# Patient Record
Sex: FEMALE | Race: WHITE
[De-identification: names, ages, dates, MRNs, and addresses within clinical notes are randomized per-mention and may not be internally consistent; named-entity substitution may affect disease eponyms.]

---

## 2017-04-17 ENCOUNTER — HOSPITAL ENCOUNTER (OUTPATIENT)
Dept: HOSPITAL 53 - M LABDRAW1 | Age: 72
End: 2017-04-17
Attending: NURSE PRACTITIONER
Payer: MEDICARE

## 2017-04-17 DIAGNOSIS — E55.9: ICD-10-CM

## 2017-04-17 DIAGNOSIS — I10: ICD-10-CM

## 2017-04-17 DIAGNOSIS — E78.5: Primary | ICD-10-CM

## 2017-04-17 LAB
ALBUMIN SERPL BCG-MCNC: 3.8 GM/DL (ref 3.2–5.2)
ALBUMIN/GLOB SERPL: 1.19 {RATIO} (ref 1–1.93)
ALP SERPL-CCNC: 64 U/L (ref 45–117)
ALT SERPL W P-5'-P-CCNC: 22 U/L (ref 12–78)
ANION GAP SERPL CALC-SCNC: 5 MEQ/L (ref 8–16)
AST SERPL-CCNC: 14 U/L (ref 15–37)
BILIRUB SERPL-MCNC: 0.8 MG/DL (ref 0.2–1)
BUN SERPL-MCNC: 15 MG/DL (ref 7–18)
CALCIUM SERPL-MCNC: 9.1 MG/DL (ref 8.8–10.2)
CHLORIDE SERPL-SCNC: 104 MEQ/L (ref 98–107)
CHOLEST SERPL-MCNC: 168 MG/DL (ref ?–200)
CO2 SERPL-SCNC: 33 MEQ/L (ref 21–32)
CREAT SERPL-MCNC: 0.77 MG/DL (ref 0.55–1.02)
GFR SERPL CREATININE-BSD FRML MDRD: > 60 ML/MIN/{1.73_M2} (ref 45–?)
GLUCOSE SERPL-MCNC: 108 MG/DL (ref 80–110)
POTASSIUM SERPL-SCNC: 4.6 MEQ/L (ref 3.5–5.1)
PROT SERPL-MCNC: 7 GM/DL (ref 6.4–8.2)
SODIUM SERPL-SCNC: 142 MEQ/L (ref 136–145)
TRIGL SERPL-MCNC: 94 MG/DL (ref ?–150)

## 2017-08-30 ENCOUNTER — HOSPITAL ENCOUNTER (OUTPATIENT)
Dept: HOSPITAL 53 - M LAB | Age: 72
End: 2017-08-30
Attending: OPHTHALMOLOGY
Payer: MEDICARE

## 2017-08-30 DIAGNOSIS — H25.12: Primary | ICD-10-CM

## 2017-08-30 DIAGNOSIS — I10: ICD-10-CM

## 2017-08-30 LAB
ANION GAP SERPL CALC-SCNC: 8 MEQ/L (ref 8–16)
BUN SERPL-MCNC: 16 MG/DL (ref 7–18)
CALCIUM SERPL-MCNC: 9.9 MG/DL (ref 8.8–10.2)
CHLORIDE SERPL-SCNC: 103 MEQ/L (ref 98–107)
CO2 SERPL-SCNC: 32 MEQ/L (ref 21–32)
CREAT SERPL-MCNC: 0.83 MG/DL (ref 0.55–1.02)
GFR SERPL CREATININE-BSD FRML MDRD: > 60 ML/MIN/{1.73_M2} (ref 39–?)
GLUCOSE SERPL-MCNC: 114 MG/DL (ref 83–110)
POTASSIUM SERPL-SCNC: 4.5 MEQ/L (ref 3.5–5.1)
SODIUM SERPL-SCNC: 143 MEQ/L (ref 136–145)

## 2017-08-30 NOTE — ECGEPIP
Stationary ECG Study

                              [facility]

                                       

                                       Test Date:    2017

Pat Name:     YELENA EVANS         Department:   

Patient ID:   [id number]                 Room:         -

Gender:       F                        Technician:   [facility]

:          1945               Requested By: ORLIN BENDER

Order Number: [id number]     Reading MD:   Jusitn Rehman

                                 Measurements

Intervals                              Axis          

Rate:         71                       P:            53

VA:           171                      QRS:          52

QRSD:         80                       T:            17

QT:           363                                    

QTc:          394                                    

                           Interpretive Statements

SINUS RHYTHM

LOW QRS VOLTAGE IN PRECORDIAL LEADS

Poor R-wave progression.

No prior ECG available for comparison at the time of interpretation.

 

Electronically Signed On 2017 23:23:13 EDT by Justin Rehman

## 2018-06-15 ENCOUNTER — HOSPITAL ENCOUNTER (OUTPATIENT)
Dept: HOSPITAL 53 - M LABDRAW1 | Age: 73
End: 2018-06-15
Attending: NURSE PRACTITIONER
Payer: MEDICARE

## 2018-06-15 DIAGNOSIS — I10: ICD-10-CM

## 2018-06-15 DIAGNOSIS — E78.5: Primary | ICD-10-CM

## 2018-06-15 DIAGNOSIS — E55.9: ICD-10-CM

## 2018-06-15 DIAGNOSIS — Z79.899: ICD-10-CM

## 2018-06-15 LAB
25(OH)D3 SERPL-MCNC: 43.6 NG/ML (ref 30–100)
ALBUMIN/GLOBULIN RATIO: 1.19 (ref 1–1.93)
ALBUMIN: 3.8 GM/DL (ref 3.2–5.2)
ALKALINE PHOSPHATASE: 60 U/L (ref 45–117)
ALT SERPL W P-5'-P-CCNC: 26 U/L (ref 12–78)
ANION GAP: 3 MEQ/L (ref 8–16)
AST SERPL-CCNC: 17 U/L (ref 7–37)
BASO #: 0.1 10^3/UL (ref 0–0.2)
BASO %: 1.9 % (ref 0–1)
BILIRUBIN,TOTAL: 0.8 MG/DL (ref 0.2–1)
BLOOD UREA NITROGEN: 19 MG/DL (ref 7–18)
CALCIUM LEVEL: 9 MG/DL (ref 8.8–10.2)
CARBON DIOXIDE LEVEL: 32 MEQ/L (ref 21–32)
CHLORIDE LEVEL: 106 MEQ/L (ref 98–107)
CHOLEST SERPL-MCNC: 167 MG/DL (ref ?–200)
CHOLESTEROL RISK RATIO: 2.57 (ref ?–5)
CREATININE FOR GFR: 0.75 MG/DL (ref 0.55–1.3)
EOS #: 0.2 10^3/UL (ref 0–0.5)
EOSINOPHIL NFR BLD AUTO: 5 % (ref 0–3)
GFR SERPL CREATININE-BSD FRML MDRD: > 60 ML/MIN/{1.73_M2} (ref 39–?)
GLUCOSE, FASTING: 105 MG/DL (ref 70–100)
HDLC SERPL-MCNC: 65 MG/DL (ref 40–?)
HEMATOCRIT: 42.2 % (ref 36–47)
HEMOGLOBIN: 13.8 G/DL (ref 12–15.5)
IMMATURE GRANULOCYTE %: 0.2 % (ref 0–3)
LDL CHOLESTEROL: 86 MG/DL (ref ?–100)
LYMPH #: 1.5 10^3/UL (ref 1.5–4.5)
LYMPH %: 32.7 % (ref 24–44)
MEAN CORPUSCULAR HEMOGLOBIN: 29.8 PG (ref 27–33)
MEAN CORPUSCULAR HGB CONC: 32.7 G/DL (ref 32–36.5)
MEAN CORPUSCULAR VOLUME: 91.1 FL (ref 80–96)
MONO #: 0.3 10^3/UL (ref 0–0.8)
MONO %: 6.5 % (ref 0–5)
NEUTROPHILS #: 2.5 10^3/UL (ref 1.8–7.7)
NEUTROPHILS %: 53.7 % (ref 36–66)
NONHDLC SERPL-MCNC: 102 MG/DL
NRBC BLD AUTO-RTO: 0 % (ref 0–0)
PLATELET COUNT, AUTOMATED: 293 10^3/UL (ref 150–450)
POTASSIUM SERUM: 4.2 MEQ/L (ref 3.5–5.1)
RED BLOOD COUNT: 4.63 10^6/UL (ref 4–5.4)
RED CELL DISTRIBUTION WIDTH: 13.7 % (ref 11.5–14.5)
SODIUM LEVEL: 141 MEQ/L (ref 136–145)
TOTAL PROTEIN: 7 GM/DL (ref 6.4–8.2)
TRIGLYCERIDES LEVEL: 80 MG/DL (ref ?–150)
WHITE BLOOD COUNT: 4.6 10^3/UL (ref 4–10)

## 2018-06-15 PROCEDURE — 80053 COMPREHEN METABOLIC PANEL: CPT

## 2018-07-31 ENCOUNTER — HOSPITAL ENCOUNTER (OUTPATIENT)
Dept: HOSPITAL 53 - M LABDRAW1 | Age: 73
End: 2018-07-31
Attending: NURSE PRACTITIONER
Payer: MEDICARE

## 2018-07-31 DIAGNOSIS — R93.8: Primary | ICD-10-CM

## 2018-07-31 LAB
FREE T3: 2.9 PG/ML (ref 2.2–4)
FREE T4: 0.96 NG/DL (ref 0.76–1.46)
THYROID STIMULATING HORMONE: 2.9 UIU/ML (ref 0.36–3.74)

## 2018-07-31 PROCEDURE — 84443 ASSAY THYROID STIM HORMONE: CPT

## 2020-04-15 ENCOUNTER — HOSPITAL ENCOUNTER (OUTPATIENT)
Dept: HOSPITAL 53 - M LABDRAW1 | Age: 75
End: 2020-04-15
Attending: FAMILY MEDICINE
Payer: MEDICARE

## 2020-04-15 DIAGNOSIS — I10: ICD-10-CM

## 2020-04-15 DIAGNOSIS — E55.9: Primary | ICD-10-CM

## 2020-04-15 DIAGNOSIS — Z79.899: ICD-10-CM

## 2020-04-15 LAB
25(OH)D3 SERPL-MCNC: 85.8 NG/ML (ref 30–100)
ALBUMIN SERPL BCG-MCNC: 4.2 GM/DL (ref 3.2–5.2)
ALT SERPL W P-5'-P-CCNC: 29 U/L (ref 12–78)
BILIRUB SERPL-MCNC: 1.1 MG/DL (ref 0.2–1)
BUN SERPL-MCNC: 19 MG/DL (ref 7–18)
CALCIUM SERPL-MCNC: 9.9 MG/DL (ref 8.8–10.2)
CHLORIDE SERPL-SCNC: 103 MEQ/L (ref 98–107)
CHOLEST SERPL-MCNC: 183 MG/DL (ref ?–200)
CHOLEST/HDLC SERPL: 2.73 {RATIO} (ref ?–5)
CO2 SERPL-SCNC: 31 MEQ/L (ref 21–32)
CREAT SERPL-MCNC: 0.75 MG/DL (ref 0.55–1.3)
GFR SERPL CREATININE-BSD FRML MDRD: > 60 ML/MIN/{1.73_M2} (ref 39–?)
GLUCOSE SERPL-MCNC: 103 MG/DL (ref 70–100)
HCT VFR BLD AUTO: 45.2 % (ref 36–47)
HDLC SERPL-MCNC: 67 MG/DL (ref 40–?)
HGB BLD-MCNC: 14.6 G/DL (ref 12–15.5)
LDLC SERPL CALC-MCNC: 94 MG/DL (ref ?–100)
MCH RBC QN AUTO: 29.6 PG (ref 27–33)
MCHC RBC AUTO-ENTMCNC: 32.3 G/DL (ref 32–36.5)
MCV RBC AUTO: 91.7 FL (ref 80–96)
NONHDLC SERPL-MCNC: 116 MG/DL
PLATELET # BLD AUTO: 325 10^3/UL (ref 150–450)
POTASSIUM SERPL-SCNC: 4.2 MEQ/L (ref 3.5–5.1)
PROT SERPL-MCNC: 7.4 GM/DL (ref 6.4–8.2)
RBC # BLD AUTO: 4.93 10^6/UL (ref 4–5.4)
SODIUM SERPL-SCNC: 140 MEQ/L (ref 136–145)
TRIGL SERPL-MCNC: 108 MG/DL (ref ?–150)
WBC # BLD AUTO: 5.5 10^3/UL (ref 4–10)

## 2021-04-20 ENCOUNTER — HOSPITAL ENCOUNTER (OUTPATIENT)
Dept: HOSPITAL 53 - M LAB | Age: 76
End: 2021-04-20
Attending: FAMILY MEDICINE
Payer: MEDICARE

## 2021-04-20 DIAGNOSIS — E55.9: Primary | ICD-10-CM

## 2021-04-20 DIAGNOSIS — E78.00: ICD-10-CM

## 2021-04-20 LAB
25(OH)D3 SERPL-MCNC: 77.5 NG/ML (ref 30–100)
ALBUMIN SERPL BCG-MCNC: 4.1 GM/DL (ref 3.2–5.2)
ALT SERPL W P-5'-P-CCNC: 28 U/L (ref 12–78)
BILIRUB SERPL-MCNC: 1 MG/DL (ref 0.2–1)
BUN SERPL-MCNC: 19 MG/DL (ref 7–18)
CALCIUM SERPL-MCNC: 9.6 MG/DL (ref 8.8–10.2)
CHLORIDE SERPL-SCNC: 103 MEQ/L (ref 98–107)
CHOLEST SERPL-MCNC: 191 MG/DL (ref ?–200)
CHOLEST/HDLC SERPL: 2.73 {RATIO} (ref ?–5)
CO2 SERPL-SCNC: 30 MEQ/L (ref 21–32)
CREAT SERPL-MCNC: 0.75 MG/DL (ref 0.55–1.3)
GFR SERPL CREATININE-BSD FRML MDRD: > 60 ML/MIN/{1.73_M2} (ref 39–?)
GLUCOSE SERPL-MCNC: 113 MG/DL (ref 70–100)
HCT VFR BLD AUTO: 44.5 % (ref 36–47)
HDLC SERPL-MCNC: 70 MG/DL (ref 40–?)
HGB BLD-MCNC: 14.2 G/DL (ref 12–15.5)
LDLC SERPL CALC-MCNC: 101 MG/DL (ref ?–100)
MCH RBC QN AUTO: 29.1 PG (ref 27–33)
MCHC RBC AUTO-ENTMCNC: 31.9 G/DL (ref 32–36.5)
MCV RBC AUTO: 91.2 FL (ref 80–96)
NONHDLC SERPL-MCNC: 121 MG/DL
PLATELET # BLD AUTO: 313 10^3/UL (ref 150–450)
POTASSIUM SERPL-SCNC: 4.3 MEQ/L (ref 3.5–5.1)
PROT SERPL-MCNC: 7.3 GM/DL (ref 6.4–8.2)
RBC # BLD AUTO: 4.88 10^6/UL (ref 4–5.4)
SODIUM SERPL-SCNC: 138 MEQ/L (ref 136–145)
TRIGL SERPL-MCNC: 98 MG/DL (ref ?–150)
WBC # BLD AUTO: 5.7 10^3/UL (ref 4–10)

## 2021-04-28 ENCOUNTER — HOSPITAL ENCOUNTER (OUTPATIENT)
Dept: HOSPITAL 53 - M WUC | Age: 76
End: 2021-04-28
Attending: FAMILY MEDICINE
Payer: MEDICARE

## 2021-04-28 DIAGNOSIS — M54.12: Primary | ICD-10-CM

## 2021-04-28 NOTE — REP
INDICATION:

RADICULOPATHY.



COMPARISON:

Pre operative exam of 06/15/2018



TECHNIQUE:

Seven views



FINDINGS:

Since last examination anterior cervical discectomy has been performed with an

internal fixation plate and fixing screws seen C5 through C7 inclusive.  None of the

affixing screws breach the endplates or posterior vertebral body cortices.  Bone graft

material is seen at the C5-6 and C6-7 levels.  Mild disc space narrowing seen at all

other levels.  There is some limitation of flexion and extension radiographically.

The intervertebral foramina appear mildly narrowed at C4-5 and C5-6.  The dens cannot

be effectively evaluated secondary to the superimposition of osseous structures and/or

dentition on all views.  Degenerative facet and uncovertebral joint changes are

present at every level bilaterally.





IMPRESSION:

Postsurgical changes as described above.  Degenerative changes as described above.

Although this plain radiographic evaluation of the cervical spine shows no evidence of

a fracture, it should be remembered that CT is much more sensitive than plain

radiography of the C-spine in detecting fractures.  If this examination was ordered to

rule out a fracture then CT of the cervical spine is recommended.





<Electronically signed by Kristian Cardenas > 04/28/21 [id number]

## 2021-07-20 ENCOUNTER — HOSPITAL ENCOUNTER (OUTPATIENT)
Dept: HOSPITAL 53 - M WUC | Age: 76
End: 2021-07-20
Attending: NURSE PRACTITIONER
Payer: MEDICARE

## 2021-07-20 DIAGNOSIS — N39.0: Primary | ICD-10-CM

## 2021-07-23 ENCOUNTER — HOSPITAL ENCOUNTER (EMERGENCY)
Dept: HOSPITAL 53 - M ED | Age: 76
LOS: 1 days | Discharge: HOME | End: 2021-07-24
Payer: MEDICARE

## 2021-07-23 VITALS — HEIGHT: 61 IN | BODY MASS INDEX: 25.81 KG/M2 | WEIGHT: 136.69 LBS

## 2021-07-23 DIAGNOSIS — E78.5: ICD-10-CM

## 2021-07-23 DIAGNOSIS — I10: ICD-10-CM

## 2021-07-23 DIAGNOSIS — Z79.899: ICD-10-CM

## 2021-07-23 DIAGNOSIS — E27.9: Primary | ICD-10-CM

## 2021-07-23 DIAGNOSIS — R10.9: ICD-10-CM

## 2021-07-23 DIAGNOSIS — Z79.82: ICD-10-CM

## 2021-07-23 PROCEDURE — 80047 BASIC METABLC PNL IONIZED CA: CPT

## 2021-07-23 PROCEDURE — 85025 COMPLETE CBC W/AUTO DIFF WBC: CPT

## 2021-07-23 PROCEDURE — 80076 HEPATIC FUNCTION PANEL: CPT

## 2021-07-23 PROCEDURE — 83690 ASSAY OF LIPASE: CPT

## 2021-07-23 PROCEDURE — 74177 CT ABD & PELVIS W/CONTRAST: CPT

## 2021-07-23 PROCEDURE — 96374 THER/PROPH/DIAG INJ IV PUSH: CPT

## 2021-07-23 PROCEDURE — 81001 URINALYSIS AUTO W/SCOPE: CPT

## 2021-07-23 PROCEDURE — 96361 HYDRATE IV INFUSION ADD-ON: CPT

## 2021-07-23 PROCEDURE — 99284 EMERGENCY DEPT VISIT MOD MDM: CPT

## 2021-07-23 RX ADMIN — DIATRIZOATE MEGLUMINE AND DIATRIZOATE SODIUM SCH ML: 600; 100 SOLUTION ORAL; RECTAL at 23:45

## 2021-07-24 ENCOUNTER — HOSPITAL ENCOUNTER (EMERGENCY)
Dept: HOSPITAL 53 - M ED | Age: 76
Discharge: HOME | End: 2021-07-24
Payer: MEDICARE

## 2021-07-24 VITALS — BODY MASS INDEX: 55.32 KG/M2 | WEIGHT: 293 LBS | HEIGHT: 61 IN

## 2021-07-24 VITALS — SYSTOLIC BLOOD PRESSURE: 122 MMHG | DIASTOLIC BLOOD PRESSURE: 60 MMHG

## 2021-07-24 VITALS — DIASTOLIC BLOOD PRESSURE: 79 MMHG | SYSTOLIC BLOOD PRESSURE: 156 MMHG

## 2021-07-24 DIAGNOSIS — T36.95XA: ICD-10-CM
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
## 2021-07-24 DIAGNOSIS — Z79.899: ICD-10-CM

## 2021-07-24 DIAGNOSIS — I10: ICD-10-CM

## 2021-07-24 DIAGNOSIS — Z79.82: ICD-10-CM

## 2021-07-24 DIAGNOSIS — R21: Primary | ICD-10-CM
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
## 2021-07-24 LAB
ALBUMIN SERPL BCG-MCNC: 3.3 GM/DL (ref 3.2–5.2)
ALT SERPL W P-5'-P-CCNC: 55 U/L (ref 12–78)
BASOPHILS # BLD AUTO: 0 10^3/UL (ref 0–0.2)
BASOPHILS NFR BLD AUTO: 0.2 % (ref 0–1)
BILIRUB CONJ SERPL-MCNC: 0.2 MG/DL (ref 0–0.2)
BILIRUB SERPL-MCNC: 0.6 MG/DL (ref 0.2–1)
EOSINOPHIL # BLD AUTO: 0.4 10^3/UL (ref 0–0.5)
EOSINOPHIL NFR BLD AUTO: 4.9 % (ref 0–3)
HCT VFR BLD AUTO: 41.5 % (ref 36–47)
HGB BLD-MCNC: 14 G/DL (ref 12–15.5)
LIPASE SERPL-CCNC: 73 U/L (ref 73–393)
LYMPHOCYTES # BLD AUTO: 0.3 10^3/UL (ref 1.5–5)
LYMPHOCYTES NFR BLD AUTO: 3.5 % (ref 24–44)
MCH RBC QN AUTO: 29.6 PG (ref 27–33)
MCHC RBC AUTO-ENTMCNC: 33.7 G/DL (ref 32–36.5)
MCV RBC AUTO: 87.7 FL (ref 80–96)
MONOCYTES # BLD AUTO: 0.3 10^3/UL (ref 0–0.8)
MONOCYTES NFR BLD AUTO: 4 % (ref 2–8)
NEUTROPHILS # BLD AUTO: 7.1 10^3/UL (ref 1.5–8.5)
NEUTROPHILS NFR BLD AUTO: 86.7 % (ref 36–66)
PLATELET # BLD AUTO: 302 10^3/UL (ref 150–450)
PROT SERPL-MCNC: 6.8 GM/DL (ref 6.4–8.2)
RBC # BLD AUTO: 4.73 10^6/UL (ref 4–5.4)
WBC # BLD AUTO: 8.2 10^3/UL (ref 4–10)

## 2021-07-24 PROCEDURE — 99283 EMERGENCY DEPT VISIT LOW MDM: CPT

## 2021-07-24 RX ADMIN — DIATRIZOATE MEGLUMINE AND DIATRIZOATE SODIUM SCH ML: 600; 100 SOLUTION ORAL; RECTAL at 00:15

## 2021-07-24 NOTE — REPVR
PROCEDURE INFORMATION: 

Exam: CT Abdomen And Pelvis With Contrast 

Exam date and time: 7/23/2021 11:19 PM 

Age: 76 years old 

Clinical indication: Constipation and vomiting; Patient HX: UTI 



TECHNIQUE: 

Imaging protocol: Computed tomography of the abdomen and pelvis with contrast. 

Radiation optimization: All CT scans at this facility use at least one of these 

dose optimization techniques: automated exposure control; mA and/or kV 

adjustment per patient size (includes targeted exams where dose is matched to 

clinical indication); or iterative reconstruction. 

Contrast material: ISO; Contrast volume: 100 ml; Contrast route: INTRAVENOUS 

(IV);  



COMPARISON: 

No relevant prior studies available. 



FINDINGS: 

Lungs: Slight bibasilar interstitial coarsening with minimal fibro-atelectatic 

change. 



Liver: There are some small hepatic cysts measuring up to 14 mm in the hepatic 

dome with a Hounsfield measurement of 0. 

Gallbladder and bile ducts: Normal. No calcified stones. No ductal dilation. 

Pancreas: Normal. No ductal dilation. 

Spleen: Normal. No splenomegaly. 

Adrenal glands: Right adrenal nodule measuring 10 x 11 x 12 mm with a 

Hounsfield measurement of 65. There is slight nodular fullness of the left 

adrenal which may reflect nodular hyperplasia. 

Kidneys and ureters: There is a left renal cyst measuring up to 5 x 10 mm which 

is too small to characterize. 

Stomach and bowel: Mobile cecum. Mild colonic diverticulosis, greatest in the 

sigmoid without diverticulitis. Mild stool and contrast is noted throughout 

much of the colon. 

Appendix: A normal appendix is seen. 



Intraperitoneal space: Unremarkable. No free air. No significant fluid 

collection. 

Vasculature: There is minimal atherosclerotic calcification of the abdominal 

aorta. 

Lymph nodes: Unremarkable. No enlarged lymph nodes. 

Urinary bladder: Unremarkable as visualized. 

Reproductive: Status post hysterectomy. 

Bones/joints: Unremarkable. No acute fracture. 

Soft tissues: Unremarkable. 



IMPRESSION: 

1. Right adrenal nodule measuring 10 x 11 x 12 mm. Consider 12 month follow-up 

adrenal CT. (Reference[facility]) 

2. Mild colonic diverticulosis, greatest in the sigmoid without diverticulitis. 

3. Status post hysterectomy. 

4. Otherwise negative CT abdomen/pelvis. 



COMMENTS: 

Consistent with the American College of Radiology's Incidental Findings 

Committee white paper (J Am Ana Radiol 2018): Any incidental renal lesion less 

than 1 cm or classified as too small to characterize, or any incidental cystic 

renal lesion characterized as simple-appearing, is likely benign. No follow-up 

imaging is recommended for these lesions per consensus recommendations based on 

imaging criteria. 



REFERENCES: 

Queta BECKMAN, et al. Management of Incidental Adrenal Masses: A White Paper of 

the ACR Incidental Findings Committee. J Am Ana Radiol. 2017;14(8):7735-7648. 



Electronically signed by: Sung Mclain On 07/24/2021  02:55:26 AM

## 2021-07-25 NOTE — ED PDOC
Post-Departure Follow-Up


SPOKE ALESSIO HANLEY FROM [facility] WHO INDICATED PT WAS PRESENTING TO 

FILL RX FOR MEDICATION PRESCRIBED BY OUR ED YESTERDAY WHICH WAS SENT TO A 

DIFFERENT PHARMACY. REQUESTED VERBAL ORDER WHICH WAS PROVIDED FOR PREDNISON AS 

ORDERED ON 7/24/2021











Margarita Vizcarra PA-C          Jul 25, 2021 11:22

## 2021-08-02 ENCOUNTER — HOSPITAL ENCOUNTER (INPATIENT)
Dept: HOSPITAL 53 - M ED | Age: 76
LOS: 1 days | Discharge: HOME | DRG: 392 | End: 2021-08-03
Attending: INTERNAL MEDICINE | Admitting: INTERNAL MEDICINE
Payer: MEDICARE

## 2021-08-02 VITALS — HEIGHT: 61 IN | WEIGHT: 134.48 LBS | BODY MASS INDEX: 25.39 KG/M2

## 2021-08-02 VITALS — DIASTOLIC BLOOD PRESSURE: 63 MMHG | SYSTOLIC BLOOD PRESSURE: 128 MMHG

## 2021-08-02 DIAGNOSIS — K57.30: ICD-10-CM

## 2021-08-02 DIAGNOSIS — E78.5: ICD-10-CM

## 2021-08-02 DIAGNOSIS — Z88.8: ICD-10-CM

## 2021-08-02 DIAGNOSIS — D72.829: ICD-10-CM

## 2021-08-02 DIAGNOSIS — Z79.899: ICD-10-CM

## 2021-08-02 DIAGNOSIS — Z79.82: ICD-10-CM

## 2021-08-02 DIAGNOSIS — J30.2: ICD-10-CM

## 2021-08-02 DIAGNOSIS — Z20.822: ICD-10-CM

## 2021-08-02 DIAGNOSIS — K59.00: Primary | ICD-10-CM

## 2021-08-02 DIAGNOSIS — I10: ICD-10-CM

## 2021-08-02 DIAGNOSIS — E87.1: ICD-10-CM

## 2021-08-02 LAB
ALBUMIN SERPL BCG-MCNC: 3.7 GM/DL (ref 3.2–5.2)
ALT SERPL W P-5'-P-CCNC: 49 U/L (ref 12–78)
BASOPHILS # BLD AUTO: 0.1 10^3/UL (ref 0–0.2)
BASOPHILS NFR BLD AUTO: 0.4 % (ref 0–1)
BILIRUB CONJ SERPL-MCNC: 0.2 MG/DL (ref 0–0.2)
BILIRUB SERPL-MCNC: 1.5 MG/DL (ref 0.2–1)
BUN SERPL-MCNC: 11 MG/DL (ref 7–18)
CALCIUM SERPL-MCNC: 8.5 MG/DL (ref 8.8–10.2)
CHLORIDE SERPL-SCNC: 100 MEQ/L (ref 98–107)
CO2 SERPL-SCNC: 32 MEQ/L (ref 21–32)
CORTIS BS SERPL-MCNC: 8.9 UG/DL (ref 4.3–22.4)
CREAT SERPL-MCNC: 0.67 MG/DL (ref 0.55–1.3)
CRP SERPL-MCNC: < 0.3 MG/DL (ref 0–0.3)
EOSINOPHIL # BLD AUTO: 0.1 10^3/UL (ref 0–0.5)
EOSINOPHIL NFR BLD AUTO: 0.9 % (ref 0–3)
ERYTHROCYTE [SEDIMENTATION RATE] IN BLOOD BY WESTERGREN METHOD: 7 MM/HR (ref 0–30)
GFR SERPL CREATININE-BSD FRML MDRD: > 60 ML/MIN/{1.73_M2} (ref 39–?)
GLUCOSE SERPL-MCNC: 115 MG/DL (ref 70–100)
HCT VFR BLD AUTO: 47.1 % (ref 36–47)
HGB BLD-MCNC: 15.8 G/DL (ref 12–15.5)
LIPASE SERPL-CCNC: 131 U/L (ref 73–393)
LYMPHOCYTES # BLD AUTO: 1.9 10^3/UL (ref 1.5–5)
LYMPHOCYTES NFR BLD AUTO: 14 % (ref 24–44)
MAGNESIUM SERPL-MCNC: 2.1 MG/DL (ref 1.8–2.4)
MCH RBC QN AUTO: 29.4 PG (ref 27–33)
MCHC RBC AUTO-ENTMCNC: 33.5 G/DL (ref 32–36.5)
MCV RBC AUTO: 87.7 FL (ref 80–96)
MONOCYTES # BLD AUTO: 0.8 10^3/UL (ref 0–0.8)
MONOCYTES NFR BLD AUTO: 5.7 % (ref 2–8)
NEUTROPHILS # BLD AUTO: 10.6 10^3/UL (ref 1.5–8.5)
NEUTROPHILS NFR BLD AUTO: 77.4 % (ref 36–66)
OSMOLALITY SERPL: 278 MOSM/KG (ref 280–301)
PHOSPHATE SERPL-MCNC: 3.5 MG/DL (ref 2.5–4.9)
PLATELET # BLD AUTO: 408 10^3/UL (ref 150–450)
POTASSIUM SERPL-SCNC: 4 MEQ/L (ref 3.5–5.1)
PROT SERPL-MCNC: 7.2 GM/DL (ref 6.4–8.2)
RBC # BLD AUTO: 5.37 10^6/UL (ref 4–5.4)
RSV RNA NPH QL NAA+PROBE: NEGATIVE
SODIUM SERPL-SCNC: 136 MEQ/L (ref 136–145)
T3FREE SERPL-MCNC: 2.7 PG/ML (ref 2.2–4)
T4 FREE SERPL-MCNC: 1.42 NG/DL (ref 0.76–1.46)
TSH SERPL DL<=0.005 MIU/L-ACNC: 3.27 UIU/ML (ref 0.36–3.74)
WBC # BLD AUTO: 13.8 10^3/UL (ref 4–10)

## 2021-08-02 RX ADMIN — DOCUSATE SODIUM SCH MG: 100 CAPSULE, LIQUID FILLED ORAL at 20:52

## 2021-08-02 NOTE — HPEPDOC
General


Date of Admission


21


Date of Service:  Aug 2, 2021


Chief Complaint


The patient is a 76-year-old female admitted with a reason for visit of Abd 

Pain.





History of Present Illness


HPI


Pt is a 75yo F who presents with lower abdominal pain that onset overnight and 

was accompanied by an upper body burning sensation that subsided by morning. Two

weeks ago pt was treated at urgent care for UTI w nitrofurantonin, and after 

taking for a few days she developed a rash on lower extremities. UTI symptoms of

dysuria N/V, and lower abdominal pain subsided with 7 days of nitrofurantonin.  

She was seen again at urgent care one week ago, and prescribed prednisone for 

the rash, after taking that she had minor ankle swelling. Friday, she developed 

a cough and had one episode of diarrhea, with no BM since then.





PMHx


HTN


Hyperlipidemia


Diverticulosis





SxHx


Hysterectomy / menorrhagia


2 C-sect.


Hernia Repair


Varicose vein correction b/l legs 


Cervical fusion.





FHx


Mother  pancreatic Ca


Brother terminal w pancreatic Ca 





SHx


Former smoker-quit  1pk/day since teens


No alcohol use


No illicit drugs


Retired and lived in [place] in winter





ROS


Gen: Denies fevers, chills, N, or V


HEENT: Denies dysphagia, vision changes, HAs


RESP: Denies SOB.


CVS: Denies chest pain, palpitations


ABD: Admits to lower abdominal pain and constipation


: Denies dysuria, hematuria. 


MSK: Denies weakness.


EXT: Denies current swelling.


Skin: Admits to resolving rash on b/l lower extremities





Objective


Gen: Pleasant and in no acute distress.


Psych: A+Ox3


HEENT: no cervical lymphadenopathy, PERRLA, EOMI, posterior pharynx normal, mo

ist oral cavity.


RESP: CTA b/l, no rhonchi, crackles, or wheeze.


CVS: RRR, no murmur. Good capillary refill, Distal pulses 2/4 b/l.


ABD: Tender to deep palpation in lower quadrants with mild guarding. Normoactive

bowel sounds. Non-distended.


MSK: plantarflexion and  strength 5/5 b/l.


Skin: mild petechial rash noted over b/l anterior lower legs.





Imaging


CT ABD/PEL W/IV CONTRAST ()


Reported as-


No change since the prior CT of 2021.  there is sigmoid diverticulosis 

without


acute diverticulitis.  No free air or free fluid.  No evidence of appendicitis. 

Small


right inguinal hernia contains a nonobstructed bowel loop and a small amount of


noninflamed fat.





Assessment


Pt is a 75yo F with PMHx of HTN, hyperlipidemia, and diverticulosis who presents

w lower abdominal pain and constipation that began last night. Pt was treated 

with nitrofurantoin when seen at urgent care 2 weeks ago, developed a petechial 

rash on lower extremities, discontinued medication, and was prescribed 

prednisone one week ago. On initial evaluation pt has leukocytosis and 

hyponatremia, with no acute findings on CT abdomen. She will be admitted for 

monitoring and correction of electrolyte abnormalities.





Plan


1. Abdominal Pain


SBO unlikely based on CT, and diverticulosis appears stable.


Ischemia unlikely d/t lactic acid WNL.


Likely secondary to constipation.


UA showed no signs of infection, and lipase WNL at 131.





2. Constipation


We will put Pt on clear liquid diet, and if she tolerates well we will consider 

advancing tomorrow.


We will start Docusate and milk of magnesia PRN.





3. Hyponatremia


Initial labs- Na at 129.


We will check serum osmolality, urine osmolality and urine electrolytes.


We will check thyroid labs.


We will check cortisol baseline.


We will check Mg and Phosphorus.


Pending repeat BMP, we will start NS at 60cc/hr and will order BMP for every 4 

hours.





4. Leukocytosis


Initial labs- WBC at 13.6, ESR at 7.


Suspected to be 2/2 recent prednisone use.


We will continue to trend WBC.





5. HTN


We are holding home hydrochlorothiazide.


We will continue home dose Aspirin.





6. Hyperlipidemia


We will continue home dose simvastatin.





7. Seasonal Allergies


We will continue home dose Zyrtec.





8. DVT Prophylaxis


We will start Heparin SC.





Code status:


- Full code 





Disposition:


We will evaluate pt at least one night for sx improvement and electrolyte 

correction.  PT/OT orders will be placed, and we will modify plan based on 

toleration of current diet and clinical status.





Home Medications


Scheduled


Aspirin (Aspirin EC) 81 Mg Tablet.dr, 81 MG PO DAILY, (Reported)


Benazepril/Hydrochlorothiazide (Benazepril-Hctz 20-12.5 mg Tab) 1 Each Tablet, 1

TAB PO DAILY, (Reported)


Calcium Citrate/Vitamin D3 (Citracal + D Maximum Caplet) 1 Each Tablet, 1 TAB PO

DAILY, (Reported)


Cetirizine HCl (Cetirizine HCl) 10 Mg Tablet, 10 MG PO DAILY, (Reported)


Cholecalciferol (Vitamin D3) (Vitamin D3) 1,000 Unit Tablet, 2,000 UNITS PO RAYNE

LY, (Reported)


Gluc Mcknight/Chondro Mcknight A/Vit C/Mn (Glucosamine Chondroitin Tab) 1 Each Tablet, 1 TAB

PO DAILY, (Reported)


Multivitamins (Thera M Plus Tablet) 1 Each Tablet, 1 TAB PO DAILY, (Reported)


Simvastatin (Zocor) 20 Mg Tab, 20 MG PO QHS, (Reported)





Scheduled PRN


Propylene Glycol/Peg 400/Pf (Systane 0.3-0.4% Eye Drop) 1 Each Droperette, 1 

DROP OU QID PRN for DRY EYES, (Reported)





Allergies


Coded Allergies:  


     nitrofurantoin (Verified  Allergy, Unknown, rash, 21)





A-FIB/CHADSVASC


A-FIB History


Current/History of A-Fib/PAF?:  No


Current PO Anticoag Therapy:  No





Vital Signs





Vital Signs








  Date Time  Temp Pulse Resp B/P (MAP) Pulse Ox O2 Delivery O2 Flow Rate FiO2


 


21 14:36        


 


21 11:40 97.9 82 18  97 Room Air  











Laboratory Data


Labs 24H


Laboratory Tests 2


21 14:04: 


Urine Color STRAW, Urine Appearance CLEAR, Urine pH 7.0, Urine Specific Gravity 

1.001L, Urine Protein NEGATIVE, Urine Glucose (UA) NEGATIVE, Urine Ketones 

NEGATIVE, Urine Blood NEGATIVE, Urine Nitrite NEGATIVE, Urine Bilirubin 

NEGATIVE, Urine Urobilinogen 0.2, Urine Leukocyte Esterase NEGATIVE, Urine WBC 

(Auto) 0, Urine RBC (Auto) 1, Urine Hyaline Casts (Auto) 0, Urine Bacteria 

(Auto) NEGATIVE, Urine Squamous Epithelial Cells 0, Urine Sperm (Auto) 


21 14:28: 


Immature Granulocyte % (Auto) 1.6, Neutrophils (%) (Auto) 77.4H, Lymphocytes (%)

(Auto) 14.0L, Monocytes (%) (Auto) 5.7, Eosinophils (%) (Auto) 0.9, Basophils (%

) (Auto) 0.4, Neutrophils # (Auto) 10.6H, Lymphocytes # (Auto) 1.9, Monocytes # 

(Auto) 0.8, Eosinophils # (Auto) 0.1, Basophils # (Auto) 0.1, Nucleated Red 

Blood Cells % (auto) 0.0, Erythrocyte Sedimentation Rate 7, Lactic Acid Level 

1.6, Total Bilirubin 1.5H, Direct Bilirubin 0.2, Aspartate Amino Transf 

(AST/SGOT) 36, Alanine Aminotransferase (ALT/SGPT) 49, Alkaline Phosphatase 67, 

C-Reactive Protein, Quantitative < 0.30, Total Protein 7.2, Albumin 3.7, 

Albumin/Globulin Ratio 1.1L, Lipase 131


21 14:35: 


POC Glucose (Misc Panel) 123H, POC Sodium (Misc Panel) 129L, POC Potassium (Misc

Panel) 3.9, POC Chloride (Misc Panel) 89L, POC Total CO2 (Misc Panel) 29.0H, POC

Blood Urea Nitrogen (Misc Panel 12, POC Ionized Calcium (Misc Panel) 4.7, POC 

Creatinine (Misc Panel) 0.7, POC Hematocrit (Misc Panel) 50.0


21 16:49: 


CBC/BMP


Laboratory Tests


21 14:28











Plan / VTE


VTE Prophylaxis Ordered?:  Yes





GME ATTESTATION


GME ATTESTATION


My faculty preceptor for this patient encounter was physically present during 

the encounter and was fully available. All aspects of the patient interview, 

examination, medical decision making process, and medical care plan development 

were reviewed and approved by the faculty preceptor. The faculty preceptor is 

aware and concurs with the plan as stated in the body of this note and will 

attest to such by his/her cosignature.





ATTENDING NOTE


I, Abraham Romo, have independently examined this patient and performed my own 

physical exam, as well as reviewed the documentation and edited where necessary.

I have discussed in detail with the resident / student the findings and plan of 

treatment as documented by the resident / student and edited their note. I agree

with their findings and treatment plan and have edited their documentation. I 

will continue to follow the patient during this hospital stay.





Patient is a 76-year-old  female with a past medical history of 

hypertension, dyslipidemia, diverticulosis, who presented to the emergency room 

with complaints of lower abdominal discomfort described as 7/10 burning 

continuously pain or lower abdomen. 





Patient was recently treated for urinary tract infection 2 weeks ago with 

nitrofurantoin. 





Patient had developed a rash of her lower extremities while on nitrofurantoin. 

She had subsequently discontinued use after 7 days and visited the emergency 

room where she was given a course of prednisone taper.





Patient reports a long-standing history of constipation.





In the emergency room, patient had a CT scan of her abdomen that was unrevealing

other than a right inguinal hernia that was containing bowel loops that were 

non-obstructed. Hospitalist service was consulted for admission. After patient's

sodium was noted to be 129.





Physical reveals positive bowel sounds. No significant tenderness is appreciated

of her abdomen.





Assessment and plan:


- Will continue with stool softeners and clear liquid diet at this time and will

advance tomorrow if she is clinically feeling better


- For her hyponatremia is likely medication and volume loss induced


- Will check serum / urine osmolality, urine electrolytes, thyroid function, 

cortisol baseline


- Will hold lisinopril and HCTZ


- Will check BMP [id number] and start NS at 60 cc/hour











MIN CAMPBELL S-3              Aug 2, 2021 17:44


ABRAHAM ROMO MD                 Aug 2, 2021 18:19

## 2021-08-02 NOTE — REP
INDICATION:

abd pain



COMPARISON:

07/24/2021..



TECHNIQUE:

CT Scan of the abdomen and pelvis was performed with intravenous administration of 100

cc of Isovue 370, without oral contrast.  Sagittal and coronal reconstruction images

are performed.



FINDINGS:

Lung bases: Unremarkable.

Liver:  There are few stable cysts in the liver.

Gallbladder:  Unremarkable.

Spleen:  Normal.

Adrenals:  There is a stable 1.2 cm right adrenal nodule.

Pancreas:  Normal.

Kidneys:  Normal.

Small and large bowel: There is sigmoid diverticulosis without acute diverticulitis.

Free fluid:  None.

Abdominal aorta: No aneurysm or dissection.

Adenopathy:  None.

Appendix: Not inflamed.

Osseous structures:  Unremarkable.

Pelvis:  No mass.  There is a small right inguinal hernia containing a nonobstructed

bowel loop and a small amount of noninflamed fat.  Prior hysterectomy.



IMPRESSION:

No change since the prior CT of 07/24/2021.  there is sigmoid diverticulosis without

acute diverticulitis.  No free air or free fluid.  No evidence of appendicitis.  Small

right inguinal hernia contains a nonobstructed bowel loop and a small amount of

noninflamed fat.





<Electronically signed by Agapito Zuniga > 08/02/21 [id number]

## 2021-08-03 VITALS — DIASTOLIC BLOOD PRESSURE: 65 MMHG | SYSTOLIC BLOOD PRESSURE: 130 MMHG

## 2021-08-03 LAB
BUN SERPL-MCNC: 12 MG/DL (ref 7–18)
BUN SERPL-MCNC: 12 MG/DL (ref 7–18)
CALCIUM SERPL-MCNC: 8.7 MG/DL (ref 8.8–10.2)
CALCIUM SERPL-MCNC: 9 MG/DL (ref 8.8–10.2)
CHLORIDE SERPL-SCNC: 98 MEQ/L (ref 98–107)
CHLORIDE SERPL-SCNC: 98 MEQ/L (ref 98–107)
CO2 SERPL-SCNC: 31 MEQ/L (ref 21–32)
CO2 SERPL-SCNC: 31 MEQ/L (ref 21–32)
CREAT SERPL-MCNC: 0.79 MG/DL (ref 0.55–1.3)
CREAT SERPL-MCNC: 0.88 MG/DL (ref 0.55–1.3)
GFR SERPL CREATININE-BSD FRML MDRD: > 60 ML/MIN/{1.73_M2} (ref 39–?)
GFR SERPL CREATININE-BSD FRML MDRD: > 60 ML/MIN/{1.73_M2} (ref 39–?)
GLUCOSE SERPL-MCNC: 103 MG/DL (ref 70–100)
GLUCOSE SERPL-MCNC: 88 MG/DL (ref 70–100)
HCT VFR BLD AUTO: 43.9 % (ref 36–47)
HGB BLD-MCNC: 14.5 G/DL (ref 12–15.5)
MCH RBC QN AUTO: 29.4 PG (ref 27–33)
MCHC RBC AUTO-ENTMCNC: 33 G/DL (ref 32–36.5)
MCV RBC AUTO: 89 FL (ref 80–96)
PLATELET # BLD AUTO: 336 10^3/UL (ref 150–450)
POTASSIUM SERPL-SCNC: 4.2 MEQ/L (ref 3.5–5.1)
POTASSIUM SERPL-SCNC: 4.6 MEQ/L (ref 3.5–5.1)
RBC # BLD AUTO: 4.93 10^6/UL (ref 4–5.4)
SODIUM SERPL-SCNC: 134 MEQ/L (ref 136–145)
SODIUM SERPL-SCNC: 135 MEQ/L (ref 136–145)
WBC # BLD AUTO: 10 10^3/UL (ref 4–10)

## 2021-08-03 RX ADMIN — DOCUSATE SODIUM SCH MG: 100 CAPSULE, LIQUID FILLED ORAL at 08:45

## 2021-08-03 NOTE — DS.PDOC
Discharge Summary


General


Date of Admission


Aug 2, 2021 at 17:50


Date of Discharge


8/3/21


Attending Physician:  CLAUDIA SPARKS MD





Discharge Summary


PROCEDURES PERFORMED DURING STAY: [None].





ADMITTING DIAGNOSES: 


1. Hyponatremia


2. Abdominal pain with constipation.


4. Leukocytosis


5. HTN


6. Hyperlipidemia





Code status:


- Full code 





DISCHARGE DIAGNOSES:


1. Leukocytosis from steroids that were discontinued


2. HTN


3. Hyperlipidemia








COMPLICATIONS/CHIEF COMPLAINT: Dehydration.





HISTORY OF PRESENT ILLNESS: Patient presented to ER on 8/2/21 with abdominal 

pain, without bowel movements for 4 days. Patient says that she had a UTI 2 

weeks before coming to hospital that was treated with Nitrofurantoin at urgent 

care. She then developed an allergic reaction to Nitrofurantoin and discontinued

 it 7 days after starting an 8 day dose of the medication. Allergic reaction 

consisted of generalized weakness and rash over both of her legs. She was 

prescribed steroids to help resolve the rash from nitrofurantoin. On electrolyte

 assessment patient was found to have hyponatremia with Na+ of 129. Hospitalist 

team believes that hyponatremia is caused by hydrochlorothiazide diuretic that 

patient was taking. 





HOSPITAL COURSE: Patient was admitted to the hospital on 8/2/21 for hyponatremia

 and treatment of constipation. Patient received an Abdominal CT which rules out

 SBO. Patient lactic acid level was normal making ischemia unlikely. Patient was

 prescribed docusate and milk of magnesium to help resolve constipation. Patient

 was taken off of hydrochlorothiazide and given 60 CCs of fluid every 4 hours to

 treat hyponatremia. Patient says she had a bowel movement during the 1st night 

of her stay and this resolved the abdominal pain that she was having. Patient 

hyponatremia was 134 on 8/3/21 in the morning - showing marked improvement. 








DISCHARGE MEDICATIONS: Please see below.


 


ALLERGIES: Please see below.





Review of systems: 


Patient denies headaches, nuchal rigidity, chest pain, chest palpations, SOB, 

dyspnea, tingling, numbness, weakness, new ABD pain, urinary pain or frequency, 

or abnormally colored urine or stool. 





PHYSICAL EXAMINATION ON DISCHARGE:


VITAL SIGNS: Please see below.


GENERAL: No fever


HEENT: Patient has no nuchal tenderness on palpation


NECK: No visible thyroid enlargement or lymph node enlargement


CARDIOVASCULAR EXAMINATION: Patient has normal heart sounds with S1 and S2 

present. No extra heart sounds or murmurs were heard on exam.


RESPIRATORY EXAMINATION: Patient has clear and equal lung sounds BL


ABDOMINAL EXAMINATION: Normal bowel sounds in frequency and strength. Patient 

has slight bowel tenderness on deep palpation of epigastric region of bowel.


EXTREMITIES: Patient has no extremity pain or tenderness on palpation of her 

legs. Patient has 2/4 pedal and posterior tibial pulses BL.  


SKIN: Dry, flushed, and warm. 


NEUROLOGICAL EXAMINATION: No weakness in Lower or upper extremities on strength 

testing. 


PSYCHIATRIC EXAMINATION: Patient is alert and oriented *3. 





LABORATORY DATA: Please see below.





IMAGING: 


CT ABD/PEL W/IV CONTRAST (8/2)


Reported as-


No change since the prior CT of 07/24/2021.  there is sigmoid diverticulosis 

without


acute diverticulitis.  No free air or free fluid.  No evidence of appendicitis. 

 Small


right inguinal hernia contains a nonobstructed bowel loop and a small amount of


noninflamed fat.





PROGNOSIS: Good





ACTIVITY: As tolerated.





DIET: Normal diet.





DISCHARGE PLAN: Patient should discontinue hydrochlorothiazide and continue 

taking captopril. 





DISPOSITION: Patient will be discharged to home today. 





DISCHARGE INSTRUCTIONS:


1. Discontinue hydrochlorothiazide going forward, but have the patient continue 

captopril. 





ITEMS TO FOLLOWUP ON ON OUTPATIENT:


1. Follow up with PCP in 7 days from discharge.





DISCHARGE CONDITION: Stable.





TIME SPENT ON DISCHARGE: 45 minutes.





Vital Signs/I&Os





Vital Signs








  Date Time  Temp Pulse Resp B/P (MAP) Pulse Ox O2 Delivery O2 Flow Rate FiO2


 


8/3/21 09:57    130/65    


 


8/3/21 06:00 97.8 68 16  96 Room Air  














I&O- Last 24 Hours up to 6 AM 


 


 8/3/21





 06:00


 


Intake Total 1300 ml


 


Output Total 400 ml


 


Balance 900 ml











Laboratory Data


Labs 24H


Laboratory Tests 2


8/2/21 14:04: 


Urine Color STRAW, Urine Appearance CLEAR, Urine pH 7.0, Urine Specific Gravity 

1.001L, Urine Protein NEGATIVE, Urine Glucose (UA) NEGATIVE, Urine Ketones 

NEGATIVE, Urine Blood NEGATIVE, Urine Nitrite NEGATIVE, Urine Bilirubin 

NEGATIVE, Urine Urobilinogen 0.2, Urine Leukocyte Esterase NEGATIVE, Urine WBC 

(Auto) 0, Urine RBC (Auto) 1, Urine Hyaline Casts (Auto) 0, Urine Bacteria 

(Auto) NEGATIVE, Urine Squamous Epithelial Cells 0, Urine Sperm (Auto) 


8/2/21 14:28: 


Immature Granulocyte % (Auto) 1.6, Neutrophils (%) (Auto) 77.4H, Lymphocytes (%)

 (Auto) 14.0L, Monocytes (%) (Auto) 5.7, Eosinophils (%) (Auto) 0.9, Basophils 

(%) (Auto) 0.4, Neutrophils # (Auto) 10.6H, Lymphocytes # (Auto) 1.9, Monocytes 

# (Auto) 0.8, Eosinophils # (Auto) 0.1, Basophils # (Auto) 0.1, Nucleated Red 

Blood Cells % (auto) 0.0, Erythrocyte Sedimentation Rate 7, Lactic Acid Level 

1.6, Total Bilirubin 1.5H, Direct Bilirubin 0.2, Aspartate Amino Transf 

(AST/SGOT) 36, Alanine Aminotransferase (ALT/SGPT) 49, Alkaline Phosphatase 67, 

C-Reactive Protein, Quantitative < 0.30, Total Protein 7.2, Albumin 3.7, 

Albumin/Globulin Ratio 1.1L, Lipase 131


8/2/21 14:35: 


POC Glucose (Misc Panel) 123H, POC Sodium (Misc Panel) 129L, POC Potassium (Misc

 Panel) 3.9, POC Chloride (Misc Panel) 89L, POC Total CO2 (Misc Panel) 29.0H, 

POC Blood Urea Nitrogen (Misc Panel 12, POC Ionized Calcium (Misc Panel) 4.7, 

POC Creatinine (Misc Panel) 0.7, POC Hematocrit (Misc Panel) 50.0


8/2/21 16:49: 


Coronavirus (COVID-19)(PCR) NEGATIVE, Influenza Type A (RT-PCR) NEGATIVE, 

Influenza Type B (RT-PCR) NEGATIVE, Respiratory Syncytial Virus (PCR) NEGATIVE


8/2/21 18:23: 


Anion Gap 4L, Glomerular Filtration Rate > 60.0, Osmolality 278L, Calcium Level 

8.5L, Phosphorus Level 3.5, Magnesium Level 2.1, Thyroid Stimulating Hormone 

(TSH) 3.270, Free Thyroxine 1.42, Free Triiodothyronine 2.7, Cortisol Baseline 

8.9


8/3/21 06:02: 


Anion Gap 5L, Glomerular Filtration Rate > 60.0, Calcium Level 8.7L, Nucleated 

Red Blood Cells % (auto) 0.0


CBC/BMP


Laboratory Tests


8/2/21 14:28








8/2/21 18:23








8/3/21 06:02











Discharge Medications


Scheduled


Aspirin (Aspirin EC) 81 Mg Tablet.dr, 81 MG PO DAILY, (Reported)


Benazepril Hcl (Benazepril HCl) 20 Mg Tablet, 20 MG PO DAILY


Calcium Citrate/Vitamin D3 (Citracal + D Maximum Caplet) 1 Each Tablet, 1 TAB PO

 DAILY, (Reported)


Cetirizine HCl (Cetirizine HCl) 10 Mg Tablet, 10 MG PO DAILY, (Reported)


Cholecalciferol (Vitamin D3) (Vitamin D3) 1,000 Unit Tablet, 2,000 UNITS PO 

DAILY, (Reported)


Gluc Mcknight/Chondro Mcknight A/Vit C/Mn (Glucosamine Chondroitin Tab) 1 Each Tablet, 1 TAB

 PO DAILY, (Reported)


Multivitamins (Thera M Plus Tablet) 1 Each Tablet, 1 TAB PO DAILY, (Reported)


Simvastatin (Zocor) 20 Mg Tab, 20 MG PO QHS, (Reported)





Scheduled PRN


Propylene Glycol/Peg 400/Pf (Systane 0.3-0.4% Eye Drop) 1 Each Droperette, 1 

DROP OU QID PRN for DRY EYES, (Reported)





Allergies


Coded Allergies:  


     nitrofurantoin (Verified  Allergy, Unknown, rash, 8/2/21)





GME ATTESTATION


My faculty preceptor for this patient encounter was physically present during 

the encounter and was fully available. All aspects of the patient interview, 

examination, medical decision making process, and medical care plan development 

were reviewed and approved by the faculty preceptor. The faculty preceptor is 

aware and concurs with the plan as stated in the body of this note and will atte

st to such by his/her cosignature.





ATTENDING NOTE


I personally examined the patient and discussed the lab and imaging findings 

with the resident and I agree with the above findings, summary and plan as 

detailed by the resident physician.











BETSY JOSE OMS-3        Aug 3, 2021 10:30


CLAUDIA SPARKS MD    Aug 4, 2021 07:41

## 2021-10-18 ENCOUNTER — HOSPITAL ENCOUNTER (OUTPATIENT)
Dept: HOSPITAL 53 - M WHC | Age: 76
End: 2021-10-18
Attending: FAMILY MEDICINE
Payer: MEDICARE

## 2021-10-18 DIAGNOSIS — Z12.31: Primary | ICD-10-CM

## 2021-10-18 NOTE — REPMRS
Patient History

The patient states she has not had a clinical breast exam in over

a year.

Patient is postmenopausal.

Family history of pancreatic cancer at age 50 in mother, 

pancreatic cancer at age 72 in brother, breast cancer at age 60 

in maternal aunt, breast cancer at age 60 in maternal aunt.

Took hormonal contraceptives for 4 years.  Took estrogen for 10 

years.

 

Tomosynthesis is performed.

 

Volpara breast density is c.

 

[facility]%.

Moderna vaccine 2/17/21 right arm. 3/17/21 right arm. Patient 

states no breast complaints today. Patient has signed MRS History

Sheet.

 

Digital Woman Screen Mammo: October 18, 2021 - Exam #: 

[id number]

Bilateral CC and MLO view(s) were taken.

 

Technologist: RT Aneudy

Prior study comparison: September 28, 2020, bilateral digital 

mammo screening bilat, performed at [facility].  

September 12, 2019, bilateral digital mammo screening bilat, 

performed at [facility].

 

FINDINGS: The breast tissue is heterogeneously dense.  This may 

lower the sensitivity of mammography.  There has been no change 

in the appearance of the mammogram from the prior studies.  There

is a moderate amount of residual fibroglandular tissue which is 

fairly symmetric. There is no interval development of dominant 

mass, areas of architectural distortion, or clustered 

microcalcification typical of malignancy.

 

Assessment: BI-RADS/ACR category 1 mammogram. Negative Mammogram.

 

Recommendation

Routine screening mammogram in 1 year (for women over age 40).

This mammogram was interpreted with the aid of an FDA-approved 

computer-aided dectection system.

 

Electronically Signed By: Agapito Zuniga MD 10/18/21 [id number]

## 2022-08-01 ENCOUNTER — HOSPITAL ENCOUNTER (OUTPATIENT)
Dept: HOSPITAL 53 - M RAD | Age: 77
End: 2022-08-01
Attending: FAMILY MEDICINE
Payer: MEDICARE

## 2022-08-01 DIAGNOSIS — K40.90: Primary | ICD-10-CM

## 2022-08-01 DIAGNOSIS — K76.89: ICD-10-CM

## 2022-08-01 DIAGNOSIS — K57.30: ICD-10-CM

## 2022-08-01 LAB
BUN SERPL-MCNC: 18 MG/DL (ref 7–18)
CREAT SERPL-MCNC: 0.83 MG/DL (ref 0.55–1.3)
GFR SERPL CREATININE-BSD FRML MDRD: > 60 ML/MIN/{1.73_M2} (ref 39–?)

## 2022-08-01 PROCEDURE — 36415 COLL VENOUS BLD VENIPUNCTURE: CPT

## 2022-08-01 PROCEDURE — 74177 CT ABD & PELVIS W/CONTRAST: CPT

## 2022-08-01 PROCEDURE — 82565 ASSAY OF CREATININE: CPT

## 2022-08-01 PROCEDURE — 84520 ASSAY OF UREA NITROGEN: CPT

## 2022-08-22 ENCOUNTER — HOSPITAL ENCOUNTER (OUTPATIENT)
Dept: HOSPITAL 53 - M EKG | Age: 77
End: 2022-08-22
Attending: ANESTHESIOLOGY
Payer: MEDICARE

## 2022-08-22 DIAGNOSIS — I10: Primary | ICD-10-CM

## 2022-08-25 ENCOUNTER — HOSPITAL ENCOUNTER (OUTPATIENT)
Dept: HOSPITAL 53 - M LABSMTC | Age: 77
End: 2022-08-25
Attending: ANESTHESIOLOGY
Payer: MEDICARE

## 2022-08-25 DIAGNOSIS — Z01.818: Primary | ICD-10-CM

## 2022-08-25 DIAGNOSIS — Z11.52: ICD-10-CM

## 2022-08-30 ENCOUNTER — HOSPITAL ENCOUNTER (OUTPATIENT)
Dept: HOSPITAL 53 - M SDC | Age: 77
Discharge: HOME | End: 2022-08-30
Attending: SURGERY
Payer: MEDICARE

## 2022-08-30 VITALS — DIASTOLIC BLOOD PRESSURE: 65 MMHG | SYSTOLIC BLOOD PRESSURE: 141 MMHG

## 2022-08-30 VITALS — BODY MASS INDEX: 24.7 KG/M2 | WEIGHT: 130.8 LBS | HEIGHT: 61 IN

## 2022-08-30 DIAGNOSIS — Z88.8: ICD-10-CM

## 2022-08-30 DIAGNOSIS — E78.9: ICD-10-CM

## 2022-08-30 DIAGNOSIS — Z87.891: ICD-10-CM

## 2022-08-30 DIAGNOSIS — K40.90: Primary | ICD-10-CM

## 2022-08-30 DIAGNOSIS — M19.90: ICD-10-CM

## 2022-08-30 DIAGNOSIS — I10: ICD-10-CM

## 2022-08-30 DIAGNOSIS — Z79.82: ICD-10-CM

## 2022-08-30 DIAGNOSIS — Z79.899: ICD-10-CM

## 2022-08-30 PROCEDURE — 49650 LAP ING HERNIA REPAIR INIT: CPT

## 2022-10-28 ENCOUNTER — HOSPITAL ENCOUNTER (OUTPATIENT)
Dept: HOSPITAL 53 - M WHC | Age: 77
End: 2022-10-28
Attending: FAMILY MEDICINE
Payer: MEDICARE

## 2022-10-28 DIAGNOSIS — M81.0: ICD-10-CM

## 2022-10-28 DIAGNOSIS — Z12.31: Primary | ICD-10-CM

## 2023-07-19 ENCOUNTER — HOSPITAL ENCOUNTER (OUTPATIENT)
Dept: HOSPITAL 53 - M LAB REF | Age: 78
End: 2023-07-19
Attending: FAMILY MEDICINE
Payer: MEDICARE

## 2023-07-19 DIAGNOSIS — E78.5: ICD-10-CM

## 2023-07-19 DIAGNOSIS — M79.10: Primary | ICD-10-CM

## 2023-07-19 LAB
ALBUMIN SERPL BCG-MCNC: 4.1 G/DL (ref 3.2–5.2)
ALP SERPL-CCNC: 71 U/L (ref 46–116)
ALT SERPL W P-5'-P-CCNC: 20 U/L (ref 7–40)
AST SERPL-CCNC: 18 U/L (ref ?–34)
BILIRUB SERPL-MCNC: 0.9 MG/DL (ref 0.3–1.2)
BUN SERPL-MCNC: 15 MG/DL (ref 9–23)
CALCIUM SERPL-MCNC: 9.5 MG/DL (ref 8.3–10.6)
CHLORIDE SERPL-SCNC: 103 MMOL/L (ref 98–107)
CHOLEST SERPL-MCNC: 162 MG/DL (ref ?–200)
CHOLEST/HDLC SERPL: 2.31 {RATIO} (ref ?–5)
CK SERPL-CCNC: 81 U/L (ref 34–145)
CO2 SERPL-SCNC: 28 MMOL/L (ref 20–31)
CREAT SERPL-MCNC: 0.67 MG/DL (ref 0.55–1.3)
GFR SERPL CREATININE-BSD FRML MDRD: > 60 ML/MIN/{1.73_M2} (ref 39–?)
GLUCOSE SERPL-MCNC: 110 MG/DL (ref 74–106)
HDLC SERPL-MCNC: 70.1 MG/DL (ref 40–?)
LDLC SERPL CALC-MCNC: 72.1 MG/DL (ref ?–100)
NONHDLC SERPL-MCNC: 91.9 MG/DL
POTASSIUM SERPL-SCNC: 4.5 MMOL/L (ref 3.5–5.1)
PROT SERPL-MCNC: 6.9 G/DL (ref 5.7–8.2)
SODIUM SERPL-SCNC: 140 MMOL/L (ref 136–145)
T4 FREE SERPL-MCNC: 1 NG/DL (ref 0.89–1.76)
TRIGL SERPL-MCNC: 99 MG/DL (ref ?–150)
TSH SERPL DL<=0.005 MIU/L-ACNC: 4.11 UIU/ML (ref 0.55–4.78)

## 2023-09-18 ENCOUNTER — HOSPITAL ENCOUNTER (EMERGENCY)
Dept: HOSPITAL 53 - M ED | Age: 78
Discharge: HOME | End: 2023-09-18
Payer: MEDICARE

## 2023-09-18 VITALS — OXYGEN SATURATION: 97 %

## 2023-09-18 VITALS — WEIGHT: 132.28 LBS | BODY MASS INDEX: 24.97 KG/M2 | HEIGHT: 61 IN

## 2023-09-18 VITALS — SYSTOLIC BLOOD PRESSURE: 125 MMHG | DIASTOLIC BLOOD PRESSURE: 70 MMHG

## 2023-09-18 VITALS — TEMPERATURE: 98.2 F

## 2023-09-18 DIAGNOSIS — Z88.1: ICD-10-CM

## 2023-09-18 DIAGNOSIS — R60.9: Primary | ICD-10-CM

## 2023-09-18 DIAGNOSIS — M54.10: ICD-10-CM

## 2023-09-18 DIAGNOSIS — I10: ICD-10-CM

## 2023-09-18 DIAGNOSIS — E78.5: ICD-10-CM

## 2023-09-18 DIAGNOSIS — Z79.899: ICD-10-CM

## 2023-09-18 LAB
ALBUMIN SERPL BCG-MCNC: 3.8 G/DL (ref 3.2–5.2)
ALP SERPL-CCNC: 62 U/L (ref 46–116)
ALT SERPL W P-5'-P-CCNC: 19 U/L (ref 7–40)
APTT BLD: 24.9 SECONDS (ref 24.8–34.2)
AST SERPL-CCNC: 10 U/L (ref ?–34)
BASOPHILS # BLD AUTO: 0.1 10^3/UL (ref 0–0.2)
BASOPHILS NFR BLD AUTO: 0.8 % (ref 0–1)
BILIRUB CONJ SERPL-MCNC: 0.3 MG/DL (ref ?–0.4)
BILIRUB SERPL-MCNC: 1 MG/DL (ref 0.3–1.2)
BUN SERPL-MCNC: 17 MG/DL (ref 9–23)
CALCIUM SERPL-MCNC: 9.2 MG/DL (ref 8.3–10.6)
CHLORIDE SERPL-SCNC: 105 MMOL/L (ref 98–107)
CO2 SERPL-SCNC: 31 MMOL/L (ref 20–31)
CREAT SERPL-MCNC: 0.7 MG/DL (ref 0.55–1.3)
CRP SERPL-MCNC: < 0.4 MG/DL (ref ?–1)
EOSINOPHIL # BLD AUTO: 0.1 10^3/UL (ref 0–0.5)
EOSINOPHIL NFR BLD AUTO: 0.9 % (ref 0–3)
ERYTHROCYTE [SEDIMENTATION RATE] IN BLOOD BY WESTERGREN METHOD: 19 MM/HR (ref 0–30)
GFR SERPL CREATININE-BSD FRML MDRD: > 60 ML/MIN/{1.73_M2} (ref 39–?)
GLUCOSE SERPL-MCNC: 108 MG/DL (ref 74–106)
HCT VFR BLD AUTO: 40.7 % (ref 36–47)
HGB BLD-MCNC: 13.5 G/DL (ref 12–15.5)
INR PPP: 1.04
LYMPHOCYTES # BLD AUTO: 1 10^3/UL (ref 1.5–5)
LYMPHOCYTES NFR BLD AUTO: 13.6 % (ref 24–44)
MCH RBC QN AUTO: 30.1 PG (ref 27–33)
MCHC RBC AUTO-ENTMCNC: 33.2 G/DL (ref 32–36.5)
MCV RBC AUTO: 90.8 FL (ref 80–96)
MONOCYTES # BLD AUTO: 0.4 10^3/UL (ref 0–0.8)
MONOCYTES NFR BLD AUTO: 5.7 % (ref 2–8)
NEUTROPHILS # BLD AUTO: 6 10^3/UL (ref 1.5–8.5)
NEUTROPHILS NFR BLD AUTO: 78.6 % (ref 36–66)
PLATELET # BLD AUTO: 296 10^3/UL (ref 150–450)
POTASSIUM SERPL-SCNC: 4.1 MMOL/L (ref 3.5–5.1)
PROT SERPL-MCNC: 6.6 G/DL (ref 5.7–8.2)
PROTHROMBIN TIME: 13.3 SECONDS (ref 12.5–14.5)
RBC # BLD AUTO: 4.48 10^6/UL (ref 4–5.4)
SODIUM SERPL-SCNC: 142 MMOL/L (ref 136–145)
WBC # BLD AUTO: 7.6 10^3/UL (ref 4–10)

## 2023-10-05 ENCOUNTER — HOSPITAL ENCOUNTER (OUTPATIENT)
Dept: HOSPITAL 53 - M LAB REF | Age: 78
End: 2023-10-05
Attending: FAMILY MEDICINE
Payer: MEDICARE

## 2023-10-05 DIAGNOSIS — R39.9: Primary | ICD-10-CM

## 2023-10-06 ENCOUNTER — HOSPITAL ENCOUNTER (OUTPATIENT)
Dept: HOSPITAL 53 - M PLALAB | Age: 78
End: 2023-10-06
Attending: FAMILY MEDICINE
Payer: MEDICARE

## 2023-10-06 DIAGNOSIS — M25.561: Primary | ICD-10-CM

## 2023-10-06 LAB
BASOPHILS # BLD AUTO: 0.1 10^3/UL (ref 0–0.2)
BASOPHILS NFR BLD AUTO: 1.1 % (ref 0–1)
EOSINOPHIL # BLD AUTO: 0.2 10^3/UL (ref 0–0.5)
EOSINOPHIL NFR BLD AUTO: 3.2 % (ref 0–3)
HCT VFR BLD AUTO: 40.4 % (ref 36–47)
HGB BLD-MCNC: 13.5 G/DL (ref 12–15.5)
LYMPHOCYTES # BLD AUTO: 2 10^3/UL (ref 1.5–5)
LYMPHOCYTES NFR BLD AUTO: 32.4 % (ref 24–44)
MCH RBC QN AUTO: 30.8 PG (ref 27–33)
MCHC RBC AUTO-ENTMCNC: 33.4 G/DL (ref 32–36.5)
MCV RBC AUTO: 92 FL (ref 80–96)
MONOCYTES # BLD AUTO: 0.4 10^3/UL (ref 0–0.8)
MONOCYTES NFR BLD AUTO: 6.5 % (ref 2–8)
NEUTROPHILS # BLD AUTO: 3.6 10^3/UL (ref 1.5–8.5)
NEUTROPHILS NFR BLD AUTO: 56.6 % (ref 36–66)
PLATELET # BLD AUTO: 325 10^3/UL (ref 150–450)
RBC # BLD AUTO: 4.39 10^6/UL (ref 4–5.4)
WBC # BLD AUTO: 6.3 10^3/UL (ref 4–10)

## 2024-06-06 ENCOUNTER — HOSPITAL ENCOUNTER (OUTPATIENT)
Dept: HOSPITAL 53 - M LAB REF | Age: 79
End: 2024-06-06
Attending: FAMILY MEDICINE
Payer: MEDICARE

## 2024-06-06 DIAGNOSIS — N39.0: Primary | ICD-10-CM

## 2024-06-12 ENCOUNTER — HOSPITAL ENCOUNTER (EMERGENCY)
Dept: HOSPITAL 53 - M ED | Age: 79
Discharge: HOME | End: 2024-06-12
Payer: MEDICARE

## 2024-06-12 VITALS — TEMPERATURE: 97 F | SYSTOLIC BLOOD PRESSURE: 139 MMHG | DIASTOLIC BLOOD PRESSURE: 65 MMHG | OXYGEN SATURATION: 95 %

## 2024-06-12 VITALS — WEIGHT: 129.63 LBS | HEIGHT: 61 IN | BODY MASS INDEX: 24.47 KG/M2

## 2024-06-12 DIAGNOSIS — N39.0: Primary | ICD-10-CM

## 2024-06-12 DIAGNOSIS — M17.12: ICD-10-CM

## 2024-06-12 DIAGNOSIS — Z87.891: ICD-10-CM

## 2024-06-12 DIAGNOSIS — F10.10: ICD-10-CM

## 2024-06-12 DIAGNOSIS — Z79.899: ICD-10-CM

## 2024-06-12 DIAGNOSIS — K59.00: ICD-10-CM

## 2024-06-12 DIAGNOSIS — Z88.8: ICD-10-CM

## 2024-06-12 DIAGNOSIS — I10: ICD-10-CM

## 2024-06-12 LAB
ALBUMIN SERPL BCG-MCNC: 3.8 G/DL (ref 3.2–5.2)
ALP SERPL-CCNC: 79 U/L (ref 46–116)
ALT SERPL W P-5'-P-CCNC: 15 U/L (ref 7–40)
AST SERPL-CCNC: 10 U/L (ref ?–34)
BASOPHILS # BLD AUTO: 0.1 10^3/UL (ref 0–0.2)
BASOPHILS NFR BLD AUTO: 0.6 % (ref 0–1)
BILIRUB CONJ SERPL-MCNC: 0.3 MG/DL (ref ?–0.4)
BILIRUB SERPL-MCNC: 0.9 MG/DL (ref 0.3–1.2)
BUN SERPL-MCNC: 13 MG/DL (ref 9–23)
CALCIUM SERPL-MCNC: 9.9 MG/DL (ref 8.3–10.6)
CHLORIDE SERPL-SCNC: 99 MMOL/L (ref 98–107)
CO2 SERPL-SCNC: 29 MMOL/L (ref 20–31)
CREAT SERPL-MCNC: 0.95 MG/DL (ref 0.55–1.3)
EOSINOPHIL # BLD AUTO: 0.1 10^3/UL (ref 0–0.5)
EOSINOPHIL NFR BLD AUTO: 0.4 % (ref 0–3)
GFR SERPL CREATININE-BSD FRML MDRD: > 60 ML/MIN/{1.73_M2} (ref 39–?)
GLUCOSE SERPL-MCNC: 113 MG/DL (ref 74–106)
HCT VFR BLD AUTO: 44.1 % (ref 36–47)
HGB BLD-MCNC: 14.6 G/DL (ref 12–15.5)
LYMPHOCYTES # BLD AUTO: 1.6 10^3/UL (ref 1.5–5)
LYMPHOCYTES NFR BLD AUTO: 13.2 % (ref 24–44)
MCH RBC QN AUTO: 29.6 PG (ref 27–33)
MCHC RBC AUTO-ENTMCNC: 33.1 G/DL (ref 32–36.5)
MCV RBC AUTO: 89.3 FL (ref 80–96)
MONOCYTES # BLD AUTO: 0.9 10^3/UL (ref 0–0.8)
MONOCYTES NFR BLD AUTO: 7.4 % (ref 2–8)
NEUTROPHILS # BLD AUTO: 9.4 10^3/UL (ref 1.5–8.5)
NEUTROPHILS NFR BLD AUTO: 78.1 % (ref 36–66)
PLATELET # BLD AUTO: 328 10^3/UL (ref 150–450)
POTASSIUM SERPL-SCNC: 4.6 MMOL/L (ref 3.5–5.1)
PROT SERPL-MCNC: 7.3 G/DL (ref 5.7–8.2)
RBC # BLD AUTO: 4.94 10^6/UL (ref 4–5.4)
SODIUM SERPL-SCNC: 136 MMOL/L (ref 136–145)
WBC # BLD AUTO: 12 10^3/UL (ref 4–10)

## 2024-06-12 PROCEDURE — 74176 CT ABD & PELVIS W/O CONTRAST: CPT

## 2024-06-12 PROCEDURE — 81001 URINALYSIS AUTO W/SCOPE: CPT

## 2024-06-12 PROCEDURE — 96365 THER/PROPH/DIAG IV INF INIT: CPT

## 2024-06-12 PROCEDURE — 99284 EMERGENCY DEPT VISIT MOD MDM: CPT

## 2024-06-12 PROCEDURE — 80048 BASIC METABOLIC PNL TOTAL CA: CPT

## 2024-06-12 PROCEDURE — 85025 COMPLETE CBC W/AUTO DIFF WBC: CPT

## 2024-06-12 PROCEDURE — 73564 X-RAY EXAM KNEE 4 OR MORE: CPT

## 2024-06-12 PROCEDURE — 93971 EXTREMITY STUDY: CPT

## 2024-06-12 PROCEDURE — 96361 HYDRATE IV INFUSION ADD-ON: CPT

## 2024-06-12 PROCEDURE — 80076 HEPATIC FUNCTION PANEL: CPT

## 2024-06-12 PROCEDURE — 87086 URINE CULTURE/COLONY COUNT: CPT

## 2024-06-12 RX ADMIN — SODIUM CHLORIDE ONE MLS/HR: 9 INJECTION, SOLUTION INTRAVENOUS at 12:30

## 2024-06-12 RX ADMIN — CEFTRIAXONE ONE MLS/HR: 1 INJECTION, POWDER, FOR SOLUTION INTRAMUSCULAR; INTRAVENOUS at 16:36

## 2024-06-16 ENCOUNTER — HOSPITAL ENCOUNTER (EMERGENCY)
Dept: HOSPITAL 53 - M ED | Age: 79
Discharge: HOME | End: 2024-06-16
Payer: MEDICARE

## 2024-06-16 VITALS — OXYGEN SATURATION: 96 % | SYSTOLIC BLOOD PRESSURE: 141 MMHG | DIASTOLIC BLOOD PRESSURE: 72 MMHG | TEMPERATURE: 96.8 F

## 2024-06-16 VITALS — WEIGHT: 127.87 LBS | HEIGHT: 61 IN | BODY MASS INDEX: 24.14 KG/M2

## 2024-06-16 DIAGNOSIS — Z88.8: ICD-10-CM

## 2024-06-16 DIAGNOSIS — Z79.899: ICD-10-CM

## 2024-06-16 DIAGNOSIS — K59.00: Primary | ICD-10-CM

## 2024-06-16 DIAGNOSIS — I10: ICD-10-CM

## 2024-06-16 DIAGNOSIS — Z79.1: ICD-10-CM

## 2024-06-16 RX ADMIN — MAGNESIUM CITRATE ONE ML: 1.75 LIQUID ORAL at 09:40

## 2024-06-16 RX ADMIN — DOCUSATE SODIUM ONE MG: 100 CAPSULE, LIQUID FILLED ORAL at 09:40

## 2024-06-22 ENCOUNTER — HOSPITAL ENCOUNTER (EMERGENCY)
Dept: HOSPITAL 53 - M ED | Age: 79
Discharge: HOME | End: 2024-06-22
Payer: MEDICARE

## 2024-06-22 VITALS — OXYGEN SATURATION: 96 % | SYSTOLIC BLOOD PRESSURE: 147 MMHG | DIASTOLIC BLOOD PRESSURE: 70 MMHG | TEMPERATURE: 97.6 F

## 2024-06-22 VITALS — WEIGHT: 133.27 LBS | HEIGHT: 61 IN | BODY MASS INDEX: 25.16 KG/M2

## 2024-06-22 DIAGNOSIS — E78.5: ICD-10-CM
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
## 2024-06-22 DIAGNOSIS — Z88.8: ICD-10-CM

## 2024-06-22 DIAGNOSIS — I10: ICD-10-CM

## 2024-06-22 DIAGNOSIS — S93.401A: Primary | ICD-10-CM

## 2024-06-22 DIAGNOSIS — Z79.899: ICD-10-CM

## 2024-06-22 DIAGNOSIS — Z79.1: ICD-10-CM

## 2025-04-28 ENCOUNTER — HOSPITAL ENCOUNTER (OUTPATIENT)
Dept: HOSPITAL 53 - M LAB REF | Age: 80
End: 2025-04-28
Attending: FAMILY MEDICINE
Payer: MEDICARE

## 2025-04-28 DIAGNOSIS — E04.2: Primary | ICD-10-CM

## 2025-04-28 LAB
T4 FREE SERPL-MCNC: 1.45 NG/DL (ref 0.89–1.76)
TSH SERPL DL<=0.005 MIU/L-ACNC: 0.1 UIU/ML (ref 0.55–4.78)

## 2025-05-22 ENCOUNTER — HOSPITAL ENCOUNTER (OUTPATIENT)
Dept: HOSPITAL 53 - M RAD | Age: 80
End: 2025-05-22
Attending: FAMILY MEDICINE
Payer: MEDICARE

## 2025-05-22 DIAGNOSIS — E04.2: Primary | ICD-10-CM
